# Patient Record
Sex: FEMALE | Race: BLACK OR AFRICAN AMERICAN | NOT HISPANIC OR LATINO
[De-identification: names, ages, dates, MRNs, and addresses within clinical notes are randomized per-mention and may not be internally consistent; named-entity substitution may affect disease eponyms.]

---

## 2022-01-02 ENCOUNTER — TRANSCRIPTION ENCOUNTER (OUTPATIENT)
Age: 82
End: 2022-01-02

## 2023-03-27 ENCOUNTER — EMERGENCY (EMERGENCY)
Facility: HOSPITAL | Age: 83
LOS: 1 days | Discharge: HOME | End: 2023-03-27
Admitting: EMERGENCY MEDICINE
Payer: MEDICARE

## 2023-03-27 PROCEDURE — 99284 EMERGENCY DEPT VISIT MOD MDM: CPT

## 2023-03-28 VITALS
OXYGEN SATURATION: 100 % | RESPIRATION RATE: 18 BRPM | SYSTOLIC BLOOD PRESSURE: 142 MMHG | HEART RATE: 69 BPM | DIASTOLIC BLOOD PRESSURE: 80 MMHG | TEMPERATURE: 98 F

## 2023-03-28 NOTE — ED PROVIDER NOTE - CHIEF COMPLAINT
The patient is a 82y Female complaining of  The patient is a 82y Female complaining of bloody stools

## 2023-03-28 NOTE — ED PROVIDER NOTE - CLINICAL SUMMARY MEDICAL DECISION MAKING FREE TEXT BOX
patient with external hemorroids. will check cbc for anemia. no constitutional symptoms. will check pt for hypocoaguable state. belly soft. doubt diverticulitis or ischemic colitis. if cbc wnl will dc with gi follow up for colonoscopy. return precautions

## 2023-03-28 NOTE — ED PROVIDER NOTE - TEST CONSIDERED BUT NOT PERFORMED
considered gi bleed scan ct but bleeding not brisk enough to produce high yield from test Tests Considered But Not Performed

## 2023-03-28 NOTE — ED PROVIDER NOTE - PATIENT PORTAL LINK FT
You can access the FollowMyHealth Patient Portal offered by [facility] by registering at the following website: [email or web address]. By joining [employer]’s FollowMyHealth portal, you will also be able to view your health information using other applications (apps) compatible with our system.

## 2023-03-28 NOTE — ED PROVIDER NOTE - OBJECTIVE STATEMENT
82 year old female presents with 1 day of bloody mixed with stool. no chest pain or weakness.  despite triage note, patient denying abd pain. no shortness of breath. does not take blood thinners.  had prior colonoscopy without any concerning findings.

## 2024-05-08 NOTE — ED PROVIDER NOTE - PROGRESS NOTE DETAILS
NK- See paper chart for documentation, I am not the provider for this patient, my role is in downtime documentation.
lacie

## 2024-12-27 ENCOUNTER — EMERGENCY (EMERGENCY)
Facility: HOSPITAL | Age: 84
LOS: 1 days | Discharge: HOME | End: 2024-12-27
Attending: EMERGENCY MEDICINE | Admitting: EMERGENCY MEDICINE
Payer: COMMERCIAL

## 2024-12-27 VITALS
RESPIRATION RATE: 18 BRPM | DIASTOLIC BLOOD PRESSURE: 59 MMHG | HEART RATE: 62 BPM | TEMPERATURE: 99 F | SYSTOLIC BLOOD PRESSURE: 139 MMHG | OXYGEN SATURATION: 100 % | WEIGHT: 136.03 LBS

## 2024-12-27 PROCEDURE — 93010 ELECTROCARDIOGRAM REPORT: CPT

## 2024-12-27 PROCEDURE — 73060 X-RAY EXAM OF HUMERUS: CPT | Mod: 26,LT

## 2024-12-27 PROCEDURE — 73030 X-RAY EXAM OF SHOULDER: CPT | Mod: 26,LT

## 2024-12-27 PROCEDURE — 99284 EMERGENCY DEPT VISIT MOD MDM: CPT
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
## 2024-12-27 NOTE — ED PROVIDER NOTE - PATIENT PORTAL LINK FT
You can access the FollowMyHealth Patient Portal offered by [facility] by registering at the following website: [email or web address]. By joining [employer]’s FollowMyHealth portal, you will also be able to view your health information using other applications (apps) compatible with our system.
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
## 2024-12-27 NOTE — ED PROVIDER NOTE - PROGRESS NOTE DETAILS
Pt reassessed, pt endorsing reduced pain. Pt Pt reassessed, pt endorsing reduced pain. Pt not found to have any acute fx or dislocations on x-ray. Pt found to have cephalad migration of the humeral head related to chronic superior rotator cuff tearing.   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 Will give follow up with ortho/sports med. Pt is afebrile currently, vitals are stable. Pt was educated on outpatient treatment, follow up and return precautions. Shared decision making performed. Pt okay for DC home at this time. Questions answered. Pt understands and agrees with the plan for discharge home. Pt has access to appropriate follow up.   Tari Krause PGY1 Pt reassessed, pt endorsing reduced pain. Pt not found to have any acute fx or dislocations on x-ray. Pt found to have cephalad migration of the humeral head related to chronic superior rotator cuff tearing.   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 Will give follow up with ortho/sports med. Pt is afebrile currently, vitals are stable. Pt was educated on outpatient treatment, follow up and return precautions. Shared decision making performed. Pt okay for DC home at this time. Questions answered. Pt understands and agrees with the plan for discharge home. Pt has access to appropriate follow up.   Tari Krause  PGY1
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
